# Patient Record
Sex: FEMALE | Race: WHITE | NOT HISPANIC OR LATINO
[De-identification: names, ages, dates, MRNs, and addresses within clinical notes are randomized per-mention and may not be internally consistent; named-entity substitution may affect disease eponyms.]

---

## 2017-05-10 ENCOUNTER — APPOINTMENT (OUTPATIENT)
Dept: BARIATRICS | Facility: CLINIC | Age: 52
End: 2017-05-10

## 2017-05-10 VITALS
WEIGHT: 129 LBS | HEART RATE: 71 BPM | DIASTOLIC BLOOD PRESSURE: 72 MMHG | SYSTOLIC BLOOD PRESSURE: 116 MMHG | HEIGHT: 63.5 IN | BODY MASS INDEX: 22.57 KG/M2 | TEMPERATURE: 98.2 F

## 2022-01-21 ENCOUNTER — NON-APPOINTMENT (OUTPATIENT)
Age: 57
End: 2022-01-21

## 2022-03-07 ENCOUNTER — NON-APPOINTMENT (OUTPATIENT)
Age: 57
End: 2022-03-07

## 2022-03-07 ENCOUNTER — APPOINTMENT (OUTPATIENT)
Dept: BREAST CENTER | Facility: CLINIC | Age: 57
End: 2022-03-07

## 2022-03-08 ENCOUNTER — APPOINTMENT (OUTPATIENT)
Dept: HEMATOLOGY ONCOLOGY | Facility: CLINIC | Age: 57
End: 2022-03-08

## 2022-03-08 ENCOUNTER — APPOINTMENT (OUTPATIENT)
Dept: BREAST CENTER | Facility: CLINIC | Age: 57
End: 2022-03-08
Payer: COMMERCIAL

## 2022-03-08 VITALS
SYSTOLIC BLOOD PRESSURE: 107 MMHG | HEIGHT: 64 IN | HEART RATE: 81 BPM | BODY MASS INDEX: 21.34 KG/M2 | DIASTOLIC BLOOD PRESSURE: 67 MMHG | WEIGHT: 125 LBS

## 2022-03-08 DIAGNOSIS — R01.1 CARDIAC MURMUR, UNSPECIFIED: ICD-10-CM

## 2022-03-08 DIAGNOSIS — Z78.9 OTHER SPECIFIED HEALTH STATUS: ICD-10-CM

## 2022-03-08 DIAGNOSIS — Z98.84 BARIATRIC SURGERY STATUS: ICD-10-CM

## 2022-03-08 DIAGNOSIS — Z85.3 PERSONAL HISTORY OF MALIGNANT NEOPLASM OF BREAST: ICD-10-CM

## 2022-03-08 PROCEDURE — 99213 OFFICE O/P EST LOW 20 MIN: CPT

## 2022-03-08 RX ORDER — DIPHENHYDRAMINE HCL 50 MG/ML
50 VIAL (ML) INJECTION
Refills: 0 | Status: ACTIVE | COMMUNITY

## 2022-03-08 RX ORDER — ERGOCALCIFEROL (VITAMIN D2) 1250 MCG
50000 CAPSULE ORAL
Refills: 0 | Status: ACTIVE | COMMUNITY

## 2022-03-08 RX ORDER — LEVOCETIRIZINE DIHYDROCHLORIDE 5 MG/1
5 TABLET, FILM COATED ORAL
Refills: 0 | Status: ACTIVE | COMMUNITY

## 2022-03-08 RX ORDER — MONTELUKAST SODIUM 10 MG/1
10 TABLET, FILM COATED ORAL
Refills: 0 | Status: ACTIVE | COMMUNITY

## 2022-03-08 RX ORDER — FLUTICASONE PROPIONATE AND SALMETEROL 50; 500 UG/1; UG/1
500-50 POWDER RESPIRATORY (INHALATION)
Refills: 0 | Status: ACTIVE | COMMUNITY

## 2022-03-08 RX ORDER — ALBUTEROL 90 MCG
AEROSOL (GRAM) INHALATION
Refills: 0 | Status: ACTIVE | COMMUNITY

## 2022-03-08 RX ORDER — IBUPROFEN 800 MG/1
800 TABLET, FILM COATED ORAL
Refills: 0 | Status: ACTIVE | COMMUNITY

## 2022-03-08 RX ORDER — ONDANSETRON HYDROCHLORIDE 4 MG/1
4 TABLET, FILM COATED ORAL
Refills: 0 | Status: ACTIVE | COMMUNITY

## 2022-03-08 RX ORDER — CHLORDIAZEPOXIDE HYDROCHLORIDE AND CLIDINIUM BROMIDE 5; 2.5 MG/1; MG/1
CAPSULE ORAL
Refills: 0 | Status: ACTIVE | COMMUNITY

## 2022-03-12 NOTE — DATA REVIEWED
[FreeTextEntry1] : 3/31/16 Left needle LOC lumpectomy, DCIS 5/9 slides, <0.1cm lateral margin, <0.1 cm posterior/inferior margin, ER+ 95, ND + 95\par 6/2/16 Left breast reexcision, residual DCIS 5/6 slides, pos. medial, anterior, lateral and posterior margins.\par \par 6/30/16 :eft re-excision (medial, lateral, anterior, and posterior), right NLOC Bx (clip not in specimen, may be in large hematoma from core), Right 0 ALH, Left - lateral 6 mm & present on 1 slide on margin, DCIS posterior - 2 mm on margin.\par \par 7/21/16 Left breast reexcision, posterior and lateral margins, left posterior <0.1 cm from new amrgin (2 slides) 0.6 cm DCISS, suspicious for invasive, let [id number] <0.1cm from new margin (1 clide), 0.5 cm DCIS \par \par 8/25/16 - Left NS TM & Left SN Bx and right NS TM, Left 2 x LN neg, multiple foci DCIS, nipple ducts negative, right multiple foci LCIS/ALH, nipple ducts pos. ALH (focal)

## 2022-03-12 NOTE — PAST MEDICAL HISTORY
[Menarche Age ____] : age at menarche was [unfilled] [Menopause Age____] : age at menopause was [unfilled] [Total Preg ___] : G[unfilled] [History of Hormone Replacement Treatment] : has no history of hormone replacement treatment [FreeTextEntry5] : Hysterectomy in 2017 [FreeTextEntry7] : Yes [FreeTextEntry6] : No [FreeTextEntry8] : No

## 2022-03-12 NOTE — ASSESSMENT
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 Patient with extensive breast surgeries history prior. Patient also has bilateral breast silicone-based implants placed in 2017 and is unhappy with results. Patient with extensive family history of cancers and family history of breast cancer in [place] breast cancer age 39,  of disease age 43) and in maternal first cousin (affected maternal aunt's daugher, dx in early 60's). Patient with genetic testing performed of just BRCA1 and BRCA2 in . Discussed plan for patient to see Bernarda for genetic counseling today and Dr. Johnson for plastic surgery consultation. Patient verbalized understanding and agreement of plan.

## 2022-03-12 NOTE — PHYSICAL EXAM
[Supple] : supple [No Supraclavicular Adenopathy] : no supraclavicular adenopathy [Examined in the supine and seated position] : examined in the supine and seated position [de-identified] : bilateral implants

## 2022-03-12 NOTE — REASON FOR VISIT
[Consultation] : a consultation visit [FreeTextEntry1] : history of bilateral DCIS ER/OH positive s/p B/L mastectomy with SLNB in 9/2016

## 2022-03-12 NOTE — HISTORY OF PRESENT ILLNESS
[FreeTextEntry1] : 55 yo F previous [facility] patient last seen 10/11/17 presents for follow up with history of bilateral DCIS ER/KS positive s/p bilateral mastectomy with sentinel lymph node biopsy in 2016. Patient with extensive breast surgery history prior: left lumpectomy 3/2016, re-excision x2 2016 & right lumpectomy finding ADH & DCIS in 2016; re-exision of left breast 2016. Of note, patient with surgical history of AUDREY & BSO in  and lap-band gastric surgery in . Patient also has bilateral breast silicone-based implants (tissue expanders) placed in 2017 by Dr. Stoddard and unhappy with results. Patient reports feeling "tug" and feeling of "spiders crawling" on left axillary region since 2017. Patient denies history of XRT or estrogen therapy. Patient with family history of breast cancer in [facility] breast cancer age 39,  of disease age 43 and in maternal first cousin (affected maternal aunt's daugher, dx in early 60's). Of note, patient with extensive family history of other cancers, listed in family history section of note. \par \par Genetic testing: had just BRCA1 and BRCA2 testing in 2016. \par

## 2022-03-18 NOTE — DISCUSSION/SUMMARY
[FreeTextEntry1] : REASON FOR CONSULT\par Daly Childers is a 56-year-old female referred by Dr. Asim Bejarano for cancer genetic counseling and update testing due to a personal and family history of cancer. Ms. Childers was seen on 2022 at which time medical and family history was ascertained and a pedigree constructed. \par \par RELEVANT MEDICAL HISTORY\par Ms. Childers was diagnosed with bilateral breast cancer in  at the age of 50. Pathology report revealed bilateral ductal carcinoma in situ (ER+/NC+). Consequently, she underwent bilateral mastectomy.\par \par Ms. Childers initially pursued genetic testing on 3/21/2016 using [facility]’s numberFireis test, ordered by Dr. Cheli Rowe, which was negative for any variants in BRCA1 or BRCA2 (KAITY analysis included), report reviewed and confirmed. \par \par OTHER MEDICAL AND SURGICAL HISTORY:\par •	Medical History: no major medical history reported.\par •	Surgical History: cholecystectomy, gastric band, 2 ACL reconstructions, tonsillectomy, AUDREY/BSO in 2017 d/t fibroids and history of breast cancer \par \par OB/GYN HISTORY:\par Obstetrical History: \par Age at Menarche: 16\par Menopausal Status: Post-menopausal with LMP at age 51 d/t AUDREY/BSO\par Age at First Live Birth: N/A\par Oral Contraceptive Use: Yes, 8 years\par Hormone Replacement Therapy: No\par \par CANCER SCREENING HISTORY:  \par Breast: Annual clinical exams\par GYN:\par •	Pelvic Examination: Annual- history of fibroids and AUDREY/BSO\par Colon:\par •	Colonoscopy: 3 years ago- reported no history of polyps, repeat in 5 years\par Skin:  \par •	FBSE: Yes\par •	Lesions biopsied/removed: No\par \par SOCIAL HISTORY:\par •	Tobacco-product use: No\par •	Environmental exposures: No \par \par FAMILY HISTORY:\par Maternal ancestry was reported as [place] and paternal ancestry was reported as [place]. Ashkenazi [place] ancestry was denied. A detailed family history of cancer was ascertained, see below and scanned chart for pedigree. \par \par According to Ms. Childers no one else in the family has had germline testing for cancer susceptibility. Consanguinity was denied. \par 	\par RISK ASSESSMENT:\par Ms. Childers’s personal and family history is suggestive of a hereditary cancer syndrome given her history of bilateral DCIS at age 50, maternal aunt’s history of breast cancer at age 38 maternal cousin’s history of breast cancer, maternal aunt with either stomach or colon cancer, and 2 maternal cousins with stomach cancer. The patient meets National Comprehensive Cancer Network (NCCN) criteria for genetic testing. We recommended genetic testing using [facility]’s [employer]ight Update test. This test analyzes 35 genes: BRCA1, BRCA2, MLH1, MSH2, MSH6, PMS2, EPCAM, APC, MUTYH, CDKN2A, CDK4, TP53, PTEN, STK11, CDH1, BMPR1A, SMAD4, PALB2, CHEK2, HUA, NBN, BARD1, BRIP1, RAD51C, RAD51D, POLD1, POLE, GREM1, HOXB13, AXIN2, GALNT12, RPS20, RNF43, NTHL1, and MSH3.\par \par The risks, benefits and limitations of genetic testing were discussed with Ms. Childers. In addition, we discussed the purpose of genetic testing and possible test results (positive, negative, inconclusive) along with associated medical management options and psychosocial implications. Insurance coverage and potential out of pocket costs were also discussed. \par \par It was explained that risk assessment is based upon medical and family history as provided and may change in the future should new information be obtained. \par \par Following our discussion, Ms. Childers consented to the above-mentioned genetic testing panel. Blood was drawn in our laboratory and sent to [facility] today.\par \par PLAN:\par \par 1.	Blood drawn today will be sent to [facility] for analysis. \par 2.	We will contact Ms. Childers to schedule a follow-up appointment once the results are available. Results generally return in 2-3 weeks. \par \par For any additional questions please call Cancer Genetics at [phone]. \par \par \par Bernarda Rendon MS, [facility]\par Genetic Counselor, Cancer Genetics\par \par \par CC: \par Asim Bejarano MD\par \par \par \par

## 2022-03-25 ENCOUNTER — NON-APPOINTMENT (OUTPATIENT)
Age: 57
End: 2022-03-25

## 2022-03-30 NOTE — DISCUSSION/SUMMARY
[FreeTextEntry1] : REASON FOR CONSULT\par Daly Childers is a 56-year-old female who was contacted on March 25, 2022 for a discussion regarding her genetic testing results related to hereditary cancer predisposition. This consultation was conducted via telephone.\par \par Ms. Childers was originally seen by Cancer Genetics on March 8, 2022 for hereditary cancer predisposition risk assessment and update testing due to personal and family history of cancer. At that time, Ms. Childers decided to pursue genetic testing using [employer]’s Vessel Update test.\par \par TEST RESULTS: POSITIVE- MUTYH (heterozygous), Pathogenic (c.1187G>A; p.[id number]).\par \par No additional pathogenic (disease-causing) variants or additional variants of uncertain significance (VUS) were detected in the following genes [35]: BRCA1, BRCA2, MLH1, MSH2, MSH6, PMS2, EPCAM, APC, MUTYH, CDKN2A, CDK4, TP53, PTEN, STK11, CDH1, BMPR1A, SMAD4, PALB2, CHEK2, HUA, NBN, BARD1, BRIP1, RAD51C, RAD51D, POLD1, POLE, GREM1, HOXB13, AXIN2, GALNT12, RPS20, RNF43, NTHL1, and MSH3.  \par \par RESULTS INTERPRETATION AND ASSESSMENT:\par We informed Ms. 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 Individuals with a single pathogenic mutation in MUTYH may have a slightly increased risk to develop colorectal cancer compared to the general population risk. Some studies have suggested an increased risk to develop breast cancer as well. However, these studies have not been confirmed in large, pan-ethnic studies, and therefore no changes in screening were recommended at this time. \par \par Individuals with bi-allelic (two copies) MUTYH mutations are known to have autosomal recessive MUTYH-associated polyposis (MAP) characterized by adult-onset multiple colorectal adenomas and increased risk for colorectal cancer. Ms. Childers DOES NOT have MUTYH-associated polyposis as carrying a single MUTYH mutation is not sufficient to cause MAP however carrier status may impact reproductive risks. \par \par We also discussed that the cause of the Ms. Childers’s personal and family history of cancer remains unknown and that this result does not rule out a hereditary cancer risk in the patient since it is possible, although unlikely, the patient has a mutation that is not detectable by this analysis or is in an unidentified gene. It is also possible there is a hereditary cancer predisposition in the family, but the patient did not inherit it. \par \par PATIENT MANAGEMENT IMPLICATIONS:\par Given Ms. Childers’s personal and current reported family history of cancer, and her genetic test results, the following screening guidelines and risk-reducing recommendations were discussed:\par \par BREAST: \par •	Long-term management and surveillance should be based on Ms. Childers’s on- or post-treatment protocol as recommended by her breast care team. \par \par COLON:\par •	Data is unclear as to whether specialized screening is warranted for MUTYH heterozygous carriers unaffected by colorectal cancer with no family history of CRC. \par •	Ms. Childers stated she is already undergoing colonoscopy every 5 years and should continue to do so at the discretion of her treating gastroenterologist. \par \par OTHER:\par •	In the absence of other indications, Ms. Childers should practice age-appropriate cancer screening of other organ systems as recommended for the general population.\par \par IMPLICATIONS FOR FAMILY MEMBERS:\par The MUTYH mutation is inherited in an autosomal dominant pattern. Therefore, there is a 50% chance her first degree relatives are also MUTYH carriers. While being a MUTYH carrier has minimal impact on screening recommendations at this point in time, individuals may want to determine their MUTYH status for reproductive purposes to evaluate their risk to have a child with MUTYH-associated polyposis (MAP). If both the individual and their reproductive partner are found to be MUTYH carriers there is a 25% chance for each pregnancy to be affected with MAP. If an individual of reproductive age is found to be a MUTYH carrier, we recommend their reproductive partner be tested for MUTYH to assess the risk of having a pregnancy affected with MAP.\par \par PLAN:\par 1.	See above note for recommended management.\par 2.	The patient was encouraged to follow up with their physicians for cancer surveillance.\par 3.	We encourage sharing these results with family members.  They have a risk to have inherited the same mutation.  Other family may benefit from genetic testing for reproductive purposes and should contact a certified genetic counselor. Due to HIPAA and [place] laws, we are unable to directly contact other family at risk, but we are available should family members wish to reach out to us.\par 4.	Copy of report and clinic note will be mailed to Ms. Childers. \par 5.	Genetic knowledge changes rapidly. We encouraged re-contacting Cancer Genetics every 2-3 years for any changes in screening recommendations or sooner if there are significant changes in personal or family history\par \par For any additional questions please call Cancer Genetics at [phone]. \par \par \par Bernarda Rendon MS, [facility] [place], [place]\par Genetic Counselor, Cancer Genetics\par \par CC: \par Asim Bejarano MD\par

## 2022-04-03 ENCOUNTER — TRANSCRIPTION ENCOUNTER (OUTPATIENT)
Age: 57
End: 2022-04-03

## 2022-04-04 ENCOUNTER — APPOINTMENT (OUTPATIENT)
Dept: PLASTIC SURGERY | Facility: CLINIC | Age: 57
End: 2022-04-04

## 2023-03-03 PROBLEM — Z80.3 FAMILY HISTORY OF BREAST CANCER: Status: ACTIVE | Noted: 2022-03-08

## 2023-03-03 PROBLEM — Z85.3 HISTORY OF BILATERAL BREAST CANCER: Status: ACTIVE | Noted: 2022-03-08

## 2023-03-03 NOTE — DATA REVIEWED
[FreeTextEntry1] : 3/31/16 Left needle LOC lumpectomy, DCIS 5/9 slides, <0.1cm lateral margin, <0.1 cm posterior/inferior margin, ER+ 95, CA + 95\par 6/2/16 Left breast reexcision, residual DCIS 5/6 slides, pos. medial, anterior, lateral and posterior margins.\par \par 6/30/16 :eft re-excision (medial, lateral, anterior, and posterior), right NLOC Bx (clip not in specimen, may be in large hematoma from core), Right 0 ALH, Left - lateral 6 mm & present on 1 slide on margin, DCIS posterior - 2 mm on margin.\par \par 7/21/16 Left breast reexcision, posterior and lateral margins, left posterior <0.1 cm from new amrgin (2 slides) 0.6 cm DCISS, suspicious for invasive, let [id number] <0.1cm from new margin (1 clide), 0.5 cm DCIS \par \par 8/25/16 - Left NS TM & Left SN Bx and right NS TM, Left 2 x LN neg, multiple foci DCIS, nipple ducts negative, right multiple foci LCIS/ALH, nipple ducts pos. ALH (focal)

## 2023-03-03 NOTE — REASON FOR VISIT
[Follow-Up: _____] : a [unfilled] follow-up visit [FreeTextEntry1] : history of bilateral DCIS ER/[place] positive s/p B/L mastectomy with SLNB in 9/2016

## 2023-03-03 NOTE — HISTORY OF PRESENT ILLNESS
[FreeTextEntry1] : 56 yo F previous [facility] patient presents for annual follow up for re-examination with history of bilateral DCIS ER/TX positive s/p bilateral mastectomy with SLNB in 2016. Patient with extensive breast surgery history prior: left lumpectomy 3/2016, re-excision x2 2016 & right lumpectomy finding ADH & DCIS in 2016; re-exision of left breast 2016. Of note, patient with surgical history of AUDREY & BSO in  and lap-band gastric surgery in . Patient also has bilateral breast silicone-based implants (tissue expanders) placed in 2017 by Dr. Stoddard and unhappy with results. Patient reports feeling "tug" and feeling of "spiders crawling" on left axillary region since 2017. Patient denies history of XRT or estrogen therapy. \par \par At LOV, patient was referred to Dr. Johnson for plastic surgery consult, patient did not proceed with consultation? \par \par Patient with family history of breast cancer in [facility] breast cancer age 39,  of disease age 43 and in maternal first cousin (affected maternal aunt's daugher, dx in early 60's). Of note, patient with extensive family history of other cancers, listed in family history section of note. \par \par Genetic testing: had just BRCA1 and BRCA2 testing in . Patient met with genetic counselor Bernarda in 2022 and had updated genetic testing which revealed a pathogenic mutation in MUTYH gene\par

## 2023-03-03 NOTE — ASSESSMENT
[FreeTextEntry1] : 56 yo F presents for annual follow up for re-examination with history of bilateral DCIS ER/AZ positive s/p bilateral mastectomy with SLNB in August 2016. Patient with extensive breast surgery history prior. Patient also has bilateral breast silicone-based implants placed in 2017 and is unhappy with results. Patient denies history of XRT or estrogen therapy. Updated genetic testing ([facility], 3/2022) revealed a pathogenic mutation in MUTYH gene. Patient to return in 1 year for re-examination? Patient verbalizes understanding and is in agreement with the plan.\par

## 2023-03-03 NOTE — PAST MEDICAL HISTORY
[Menarche Age ____] : age at menarche was [unfilled] [Menopause Age____] : age at menopause was [unfilled] [History of Hormone Replacement Treatment] : has no history of hormone replacement treatment [Total Preg ___] : G[unfilled] [FreeTextEntry5] : Hysterectomy in 2017 [FreeTextEntry6] : No [FreeTextEntry7] : Yes [FreeTextEntry8] : No No

## 2023-03-03 NOTE — PHYSICAL EXAM
[Supple] : supple [No Supraclavicular Adenopathy] : no supraclavicular adenopathy [Examined in the supine and seated position] : examined in the supine and seated position [de-identified] : bilateral implants

## 2023-03-07 ENCOUNTER — APPOINTMENT (OUTPATIENT)
Dept: BREAST CENTER | Facility: CLINIC | Age: 58
End: 2023-03-07

## 2023-03-07 DIAGNOSIS — Z80.3 FAMILY HISTORY OF MALIGNANT NEOPLASM OF BREAST: ICD-10-CM

## 2023-03-07 DIAGNOSIS — Z85.3 PERSONAL HISTORY OF MALIGNANT NEOPLASM OF BREAST: ICD-10-CM

## 2025-01-02 ENCOUNTER — APPOINTMENT (OUTPATIENT)
Dept: OTOLARYNGOLOGY | Facility: CLINIC | Age: 60
End: 2025-01-02

## 2025-06-06 ENCOUNTER — APPOINTMENT (OUTPATIENT)
Dept: OTOLARYNGOLOGY | Facility: CLINIC | Age: 60
End: 2025-06-06

## 2025-06-20 ENCOUNTER — APPOINTMENT (OUTPATIENT)
Dept: OTOLARYNGOLOGY | Facility: CLINIC | Age: 60
End: 2025-06-20

## 2025-07-08 ENCOUNTER — APPOINTMENT (OUTPATIENT)
Dept: OTOLARYNGOLOGY | Facility: CLINIC | Age: 60
End: 2025-07-08
Payer: COMMERCIAL

## 2025-07-08 ENCOUNTER — NON-APPOINTMENT (OUTPATIENT)
Age: 60
End: 2025-07-08

## 2025-07-08 PROCEDURE — 99204 OFFICE O/P NEW MOD 45 MIN: CPT
